# Patient Record
Sex: FEMALE | Employment: UNEMPLOYED | URBAN - METROPOLITAN AREA
[De-identification: names, ages, dates, MRNs, and addresses within clinical notes are randomized per-mention and may not be internally consistent; named-entity substitution may affect disease eponyms.]

---

## 2021-10-23 LAB
ANTIBODY SCREEN, EXTERNAL: NEGATIVE
HBSAG, EXTERNAL: NEGATIVE
HCT, EXTERNAL: 39.8
HEPATITIS C AB,   EXT: NEGATIVE
HGB, EXTERNAL: 13.1
HIV, EXTERNAL: NON REACTIVE
PLATELET CNT,   EXTERNAL: 252
RUBELLA, EXTERNAL: NORMAL
T. PALLIDUM, EXTERNAL: NON REACTIVE
TYPE, ABO & RH, EXTERNAL: NORMAL
URINALYSIS, EXTERNAL: NORMAL

## 2021-10-25 LAB
CHLAMYDIA, EXTERNAL: NEGATIVE
N. GONORRHEA, EXTERNAL: NEGATIVE

## 2021-11-23 LAB
AFP, MATERNAL, EXTERNAL: NEGATIVE
NIPT, EXTERNAL: NEGATIVE
URINALYSIS, EXTERNAL: NORMAL

## 2022-03-18 LAB
ANTIBODY SCREEN, EXTERNAL: NEGATIVE
GTT, 1 HR, GLUCOLA, EXTERNAL: 143
HCT, EXTERNAL: 35.8
HGB, EXTERNAL: 11.7
PLATELET CNT,   EXTERNAL: 215
T. PALLIDUM, EXTERNAL: NON REACTIVE

## 2022-03-25 LAB
GTT 120 MIN, EXTERNAL: 106
GTT 180 MIN, EXTERNAL: 78
GTT 60 MIN, EXTERNAL: 149
GTT, FASTING, EXTERNAL: 76

## 2022-04-07 ENCOUNTER — TELEPHONE (OUTPATIENT)
Dept: OBGYN CLINIC | Age: 22
End: 2022-04-07

## 2022-04-07 NOTE — TELEPHONE ENCOUNTER
Called and left a message for the patient. She needs to have her records SENT to the practice prior to her appointment. She can be scheduled on 04/18 at 9:30 with Dr. Steven Baird in a 30 minutes slot. Please explain to the patient that she needs to arrive 15 minutes early for check in and screening and have everything sent to us next week to review prior to her appointment since she is a late in care transfer.

## 2022-04-18 ENCOUNTER — INITIAL PRENATAL (OUTPATIENT)
Dept: OBGYN CLINIC | Age: 22
End: 2022-04-18

## 2022-04-18 VITALS — SYSTOLIC BLOOD PRESSURE: 114 MMHG | DIASTOLIC BLOOD PRESSURE: 64 MMHG | WEIGHT: 228 LBS

## 2022-04-18 DIAGNOSIS — Z34.03 ENCOUNTER FOR SUPERVISION OF NORMAL FIRST PREGNANCY IN THIRD TRIMESTER: Primary | ICD-10-CM

## 2022-04-18 DIAGNOSIS — Z3A.33 33 WEEKS GESTATION OF PREGNANCY: ICD-10-CM

## 2022-04-18 PROCEDURE — 0502F SUBSEQUENT PRENATAL CARE: CPT | Performed by: OBSTETRICS & GYNECOLOGY

## 2022-04-18 NOTE — PROGRESS NOTES
Current pregnancy history:    Jarek Carter is a  25 y.o. female DECLINED No LMP recorded (lmp unknown). Patient is pregnant. .  She presents for the evaluation of amenorrhea and a positive pregnancy test.    Fetal heart tones= 140's  +FM  Currently works in a factory-on feet all day    LMP history:  The date of her LMP is 7/26/39 certain. Her last menstrual period was normal.  A urine pregnancy test was positive      Based on her LMP, her EDC is 6/1/22 and her EGA is 33 weeks,5 days. Her menstrual cycles are regular and occur approximately every 28 days  and range from 3 to 5 days. Ultrasound data:  She had an  ultrasound done by the ultrasound tech at [facility] with a gestational age of 26 weeks and 5 days giving an [address] of 6/8/22. Pregnancy symptoms:    Since her LMP she has experienced  urinary frequency, breast tenderness, and nausea. She has not been vomiting over the last few weeks. Associated signs and symptoms which she denies: dysuria, discharge, vaginal bleeding. Relevant pregnancy history: This is her first pregnancy with no prior abortions or miscarriages. Patient is having a boy   Pt has had her TDAP and only one vaccine for COVID 19   Genetic screening WNL   Negative for AFP   No swelling or concerns   No relevant medical or surgical history    Relevant past medical history:(relevant to this pregnancy): noncontributory. Pap/Occupational history:  Last pap smear: last year Results: Normal            Substance history: negative for alcohol, tobacco and street drugs. Positive for nothing. Exposure history: There is/are no indoor cat/s in the home. The patient was instructed to not change the cat litter. She admits close contact with children on a regular basis. She has had chicken pox or the vaccine in the past.   Patient denies issues with domestic violence.      Genetic Screening/Teratology Counseling: (Includes patient, baby's father, or anyone in either family with:)  1.  Patient's age >/= 28 at EDC?--no  2. Thalassemia ([facility] [place], [address], or  background): MCV<80?--no.     3.  Neural tube defect (meningomyelocele, spina bifida, anencephaly)?--no.   4.  Congenital heart defect?--no.  5.  Down syndrome?--no.   6.  Lamonte-Sachs (Taoism, [place])?--no.   7.  Canavan's Disease?--no.   8.  Familial Dysautonomia?--no.   9.  Sickle cell disease or trait ()? --no   The patient has not been tested for sickle trait  10. Hemophilia or other blood disorders?--no. 11.  Muscular dystrophy?--no. 12.  Cystic fibrosis?--no. 13.  Issaquena's Chorea?--no. 14.  Mental retardation/autism (if yes was person tested for Fragile X)?--no. 15.  Other inherited genetic or chromosomal disorder?--no. 12.  Maternal metabolic disorder (DM, PKU, etc)?--no. 17.  Patient or FOB with a child with a birth defect not listed above?--no.  17a. Patient or FOB with a birth defect themselves?--no. 18.  Recurrent pregnancy loss, or stillbirth?--no. 19.  Any medications since LMP other than prenatal vitamins (include vitamins, supplements, OTC meds, drugs, alcohol)?--no. 20.  Any other genetic/environmental exposure to discuss?--no. Infection History:  1. Lives with someone with TB or TB exposed?--no.   2.  Patient or partner has history of genital herpes?--no.  3.  Rash or viral illness since LMP?--no.    4.  History of STD (GC, CT, HPV, syphilis, HIV)? --no   5. Other: OTHER? No past medical history on file. No past surgical history on file. Social History     Occupational History    Not on file   Tobacco Use    Smoking status: Never Smoker    Smokeless tobacco: Never Used   Vaping Use    Vaping Use: Never used   Substance and Sexual Activity    Alcohol use: No     Alcohol/week: 0.0 standard drinks    Drug use: No    Sexual activity: Yes     Partners: Male     Birth control/protection: None     No family history on file.   OB History    Para Term  AB Living   1 0           SAB IAB Ectopic Molar Multiple Live Births   0 0              # Outcome Date GA Lbr Demetrio/2nd Weight Sex Delivery Anes PTL Lv   1 Current              No Known Allergies  Prior to Admission medications    Medication Sig Start Date End Date Taking? Authorizing Provider   PNV FL.51/[id number] fum/folic ac (PRENATAL PO) Take  by mouth.    Yes Provider, Historical        Review of Systems: History obtained from the patient  Constitutional: negative for weight loss, fever, night sweats  HEENT: negative for hearing loss, earache, congestion, snoring, sore throat  CV: negative for chest pain, palpitations, edema  Resp: negative for cough, shortness of breath, wheezing  Breast: negative for breast lumps, nipple discharge, galactorrhea  GI: negative for change in bowel habits, abdominal pain, black or bloody stools  : negative for frequency, dysuria, hematuria, vaginal discharge  MSK: negative for back pain, joint pain, muscle pain  Skin: negative for itching, rash, hives  Neuro: negative for dizziness, headache, confusion, weakness  Psych: negative for anxiety, depression, change in mood  Heme/lymph: negative for bleeding, bruising, pallor    Objective:  Visit Vitals  /64 (BP 1 Location: Left arm, BP Patient Position: Sitting)   Wt 228 lb (103.4 kg)   LMP  (LMP Unknown)       Physical Exam:     Constitutional  · Appearance: well-nourished, well developed, alert, in no acute distress    HENT  · Head  · Face: appears normal  · Eyes: appear normal  · Ears: normal  · Mouth: normal  · Lips: no lesions      Chest  · Respiratory Effort: breathing unlabored     Cardiovascular  · Heart:  · Auscultation: regular rate and rhythm without murmur      Gastrointestinal  · Abdominal Examination: abdomen non-tender to palpation, normal bowel sounds, no masses present  · Liver and spleen: no hepatomegaly present, spleen not palpable  · Hernias: no hernias identified    Genitourinary  · deferred    Skin  · General Inspection: no rash, no lesions identified    Neurologic/Psychiatric  · Mental Status:  · Orientation: grossly oriented to person, place and time  · Mood and Affect: mood normal, affect appropriate    Assessment:   Intrauterine pregnancy with issues addressed in problem list  Plan:   Patient declines presence of chaperone during today's visit. Offered CF testing, CVS, Nuchal Translucency, MSAFP, amnio, and discussed NIPT  Course of pregnancy discussed including visit schedule, routine U/S, glucola testing, etc.  Avoid alcoholic beverages and illicit/recreational drugs use  Take prenatal vitamins or folic acid daily. Hospital and practice style discussed with coverage system. Discussed nutrition, toxoplasmosis precautions, sexual activity, exercise, need for influenza vaccine, environmental and work hazards, travel advice, screen for domestic violence, need for seat belts. Discussed seafood, unpasteurized dairy products, deli meat, artificial sweeteners, and caffeine. Discussed current prescription drug use. Given medication list.  Discussed the use of over the counter medications and chemicals. Route of delivery discussed, including risks, benefits     Handouts given to pt.

## 2022-04-18 NOTE — PATIENT INSTRUCTIONS
Weeks 32 to 34 of Your Pregnancy: Care Instructions  Overview     During the last few weeks of your pregnancy, you may have more aches and pains. It's important to rest when you can. Your growing baby is putting more pressure on your bladder. So you may need to urinate more often. Hemorrhoids are also common. These are painful, itchy veins in the rectal area. You may want to talk with your doctor about banking your baby's umbilical cord blood. This is the blood left in the cord after birth. If you want to save this blood, you must arrange it ahead of time. You can't decide at the last minute. If you haven't already had the Tdap shot during this pregnancy, talk to your doctor about getting it. It will help protect your  against pertussis infection. Follow-up care is a key part of your treatment and safety. Be sure to make and go to all appointments, and call your doctor if you are having problems. It's also a good idea to know your test results and keep a list of the medicines you take. How can you care for yourself at home? Ease hemorrhoids  · Get more liquids, fruits, vegetables, and fiber in your diet. This will help keep your stools soft. · Avoid sitting for too long. Lie on your left side several times a day. · Clean yourself with soft, moist toilet paper. Or you can use witch hazel pads or personal hygiene pads. · If you are uncomfortable, try ice packs. Or you can sit in a warm sitz bath. Do these for 20 minutes at a time, as needed. · Use hydrocortisone cream for pain and itching. Two examples are Anusol and Preparation H Hydrocortisone. · Ask your doctor about taking an over-the-counter stool softener. Consider breastfeeding  · Experts recommend breastfeeding for 1 year or longer. · Breast milk may help protect your child from some health problems.  babies are less likely than formula-fed babies to:  ? Get ear infections, colds, diarrhea, and pneumonia. ?  Be obese or get diabetes later in life. · Breastfeeding causes the release of a hormone called oxytocin. This hormone may help your uterus shrink back faster. · Breastfeeding may help you lose weight faster. Making milk burns calories. · Breastfeeding can lower your risk of breast cancer, ovarian cancer, and osteoporosis. Decide about circumcision for your baby  · As you make this decision, it may help to think about your personal, Latter-day, and family traditions. You get to decide if you will keep your baby's penis natural or if your baby will be circumcised. · If you decide that you would like to have your baby circumcised, talk with your doctor. You can share your concerns about pain. And you can discuss your preferences for anesthesia. Where can you learn more? Go to http://www.[employer].com/  Enter X711 in the search box to learn more about \"Weeks 32 to 34 of Your Pregnancy: Care Instructions. \"  Current as of: June 16, 2021               Content Version: 13.2  © 8658-8564 Healthwise, [employer]. Care instructions adapted under license by [employer] (which disclaims liability or warranty for this information). If you have questions about a medical condition or this instruction, always ask your healthcare professional. [address] any warranty or liability for your use of this information.

## 2022-04-19 DIAGNOSIS — Z3A.33 33 WEEKS GESTATION OF PREGNANCY: ICD-10-CM

## 2022-05-04 ENCOUNTER — ROUTINE PRENATAL (OUTPATIENT)
Dept: OBGYN CLINIC | Age: 22
End: 2022-05-04

## 2022-05-04 VITALS — DIASTOLIC BLOOD PRESSURE: 64 MMHG | WEIGHT: 237 LBS | SYSTOLIC BLOOD PRESSURE: 120 MMHG

## 2022-05-04 DIAGNOSIS — Z3A.33 33 WEEKS GESTATION OF PREGNANCY: ICD-10-CM

## 2022-05-04 DIAGNOSIS — Z34.03 ENCOUNTER FOR SUPERVISION OF NORMAL FIRST PREGNANCY IN THIRD TRIMESTER: Primary | ICD-10-CM

## 2022-05-04 PROCEDURE — 0502F SUBSEQUENT PRENATAL CARE: CPT | Performed by: OBSTETRICS & GYNECOLOGY

## 2022-05-04 NOTE — PATIENT INSTRUCTIONS
Weeks 34 to 36 of Your Pregnancy: Care Instructions  Overview     By now, your baby and your belly have grown quite large. It's almost time to give birth! Your baby's lungs are almost ready to breathe air. The skull bones are firm enough to protect your baby's head, but soft enough to move down through the birth canal.  You may be feeling excited and happy at times--but also anxious or scared. You might wonder how you'll know if you're in labor or what to expect during labor. Try to be open and flexible in your expectations of the birth. Because each birth is different, there's no way to know exactly what childbirth will be like for you. Talk to your doctor or midwife about any concerns you have. If you haven't already had the Tdap shot during this pregnancy, talk to your doctor about getting it. It will help protect your  against pertussis infection. In the 36th week, you'll probably have a test for group B streptococcus (GBS). GBS is a common type of bacteria that can live in the vagina and rectum. It can make your baby sick after birth. If you test positive, you will get antibiotics during labor. The medicine will help keep your baby from getting the bacteria. Follow-up care is a key part of your treatment and safety. Be sure to make and go to all appointments, and call your doctor if you are having problems. It's also a good idea to know your test results and keep a list of the medicines you take. How can you care for yourself at home? Learn about pain relief choices  · Pain is different for everyone. Talk with your doctor about your feelings about pain. · You can choose from several types of pain relief. These include medicine, breathing techniques, and comfort measures. You can use more than one option. · If you choose to have pain medicine during labor, talk to your doctor about your options. Some medicines lower anxiety and help with some of the pain.  Others make your lower body numb so that you won't feel pain. · Be sure to tell your doctor about your pain medicine choice before you start labor or very early in your labor. You may be able to change your mind as labor progresses. Labor and delivery  · The first stage of labor has three parts: early, active, and transition. ? It's common to have early labor at home. You can stay busy or rest, eat light snacks, drink clear fluids, and start counting contractions. ? When talking during a contraction gets hard, you may be moving to active labor. During active labor, you should head for the hospital if you aren't there already. ? You are in active labor when contractions come every 3 to 4 minutes and last about 60 seconds. Your cervix is opening more rapidly. ? If your water breaks, contractions will come faster and stronger. ? During transition, your cervix is stretching, and contractions are coming more rapidly. ? You may want to push, but your cervix might not be ready. Your doctor will tell you when to push. · The second stage starts when your cervix is completely opened and you are ready to push. ? Contractions are very strong to push the baby down the birth canal.  ? You will probably feel the urge to push. You may feel like you need to have a bowel movement. ? You may be coached to push with contractions. These contractions will be very strong, but you won't have them as often. You can get a little rest between contractions. ? One last push, and your baby is born. · The third stage is when a few more contractions push out the placenta. This may take 30 minutes or less. Where can you learn more? Go to http://www.gray.com/  Enter B912 in the search box to learn more about \"Weeks 34 to 36 of Your Pregnancy: Care Instructions. \"  Current as of: June 16, 2021               Content Version: 13.2  © 5564-8854 [employer]    Care instructions adapted under license by [employer] (which disclaims liability or warranty for this information). If you have questions about a medical condition or this instruction, always ask your healthcare professional. [address] any warranty or liability for your use of this information.

## 2022-05-11 ENCOUNTER — ROUTINE PRENATAL (OUTPATIENT)
Dept: OBGYN CLINIC | Age: 22
End: 2022-05-11

## 2022-05-11 VITALS
BODY MASS INDEX: 40.97 KG/M2 | DIASTOLIC BLOOD PRESSURE: 70 MMHG | SYSTOLIC BLOOD PRESSURE: 110 MMHG | WEIGHT: 240 LBS | HEIGHT: 64 IN

## 2022-05-11 DIAGNOSIS — Z34.90 ENCOUNTER FOR SUPERVISION OF NORMAL PREGNANCY, ANTEPARTUM, UNSPECIFIED GRAVIDITY: Primary | ICD-10-CM

## 2022-05-11 DIAGNOSIS — Z3A.33 33 WEEKS GESTATION OF PREGNANCY: ICD-10-CM

## 2022-05-11 LAB — GRBS, EXTERNAL: NEGATIVE

## 2022-05-11 PROCEDURE — 0502F SUBSEQUENT PRENATAL CARE: CPT | Performed by: OBSTETRICS & GYNECOLOGY

## 2022-05-11 NOTE — PATIENT INSTRUCTIONS
Weeks 34 to 36 of Your Pregnancy: Care Instructions  Overview     By now, your baby and your belly have grown quite large. It's almost time to give birth! Your baby's lungs are almost ready to breathe air. The skull bones are firm enough to protect your baby's head, but soft enough to move down through the birth canal.  You may be feeling excited and happy at times--but also anxious or scared. You might wonder how you'll know if you're in labor or what to expect during labor. Try to be open and flexible in your expectations of the birth. Because each birth is different, there's no way to know exactly what childbirth will be like for you. Talk to your doctor or midwife about any concerns you have. If you haven't already had the Tdap shot during this pregnancy, talk to your doctor about getting it. It will help protect your  against pertussis infection. In the 36th week, you'll probably have a test for group B streptococcus (GBS). GBS is a common type of bacteria that can live in the vagina and rectum. It can make your baby sick after birth. If you test positive, you will get antibiotics during labor. The medicine will help keep your baby from getting the bacteria. Follow-up care is a key part of your treatment and safety. Be sure to make and go to all appointments, and call your doctor if you are having problems. It's also a good idea to know your test results and keep a list of the medicines you take. How can you care for yourself at home? Learn about pain relief choices  · Pain is different for everyone. Talk with your doctor about your feelings about pain. · You can choose from several types of pain relief. These include medicine, breathing techniques, and comfort measures. You can use more than one option. · If you choose to have pain medicine during labor, talk to your doctor about your options. Some medicines lower anxiety and help with some of the pain.  Others make your lower body numb so that you won't feel pain. · Be sure to tell your doctor about your pain medicine choice before you start labor or very early in your labor. You may be able to change your mind as labor progresses. Labor and delivery  · The first stage of labor has three parts: early, active, and transition. ? It's common to have early labor at home. You can stay busy or rest, eat light snacks, drink clear fluids, and start counting contractions. ? When talking during a contraction gets hard, you may be moving to active labor. During active labor, you should head for the hospital if you aren't there already. ? You are in active labor when contractions come every 3 to 4 minutes and last about 60 seconds. Your cervix is opening more rapidly. ? If your water breaks, contractions will come faster and stronger. ? During transition, your cervix is stretching, and contractions are coming more rapidly. ? You may want to push, but your cervix might not be ready. Your doctor will tell you when to push. · The second stage starts when your cervix is completely opened and you are ready to push. ? Contractions are very strong to push the baby down the birth canal.  ? You will probably feel the urge to push. You may feel like you need to have a bowel movement. ? You may be coached to push with contractions. These contractions will be very strong, but you won't have them as often. You can get a little rest between contractions. ? One last push, and your baby is born. · The third stage is when a few more contractions push out the placenta. This may take 30 minutes or less. Where can you learn more? Go to http://www.gray.com/  Enter B912 in the search box to learn more about \"Weeks 34 to 36 of Your Pregnancy: Care Instructions. \"  Current as of: June 16, 2021               Content Version: 13.2  © 7159-6924 [employer].    Care instructions adapted under license by "[employer]," (which disclaims liability or warranty for this information). If you have questions about a medical condition or this instruction, always ask your healthcare professional. [address] any warranty or liability for your use of this information.

## 2022-05-14 LAB
BACTERIA SPEC CULT: NORMAL
SERVICE CMNT-IMP: NORMAL

## 2022-05-17 NOTE — PATIENT INSTRUCTIONS
Week 40 of Your Pregnancy: Care Instructions  Overview     You are near the end of your pregnancy--and you're probably pretty uncomfortable. It may be harder to walk around. Lying down probably isn't comfortable either. You may have trouble getting to sleep or staying asleep. Most babies are born between 40 and 41 weeks. This is a good time to think about packing a bag for the hospital with items you'll need. Then you'll be ready when labor starts. Follow-up care is a key part of your treatment and safety. Be sure to make and go to all appointments, and call your doctor if you are having problems. It's also a good idea to know your test results and keep a list of the medicines you take. How can you care for yourself at home? Learn about breastfeeding  · Breastfeeding is best for your baby and good for you. · Breast milk has antibodies to help your baby fight infections. · If you breastfeed, you may lose weight faster. That's because making milk burns calories. · Learning the best ways to hold your baby will make breastfeeding easier. · Sometimes breastfeeding can make partners feel left out. If you have a partner, plan how you can care for your baby together. For example, your partner can bathe and diaper the baby. You can snuggle together when you breastfeed. · You may want to learn how to use a breast pump and store your milk. · If you choose to bottle feed, make the feeding feel like breastfeeding so you can bond with your baby. Always hold your baby and the bottle. Don't prop bottles or let your baby fall asleep with a bottle. Learn about crying  · It's common for babies to cry for 1 to 3 hours a day. Some cry more, and some cry less. · Babies don't cry to make you upset or because you're a bad parent. · Crying is how your baby communicates. Your baby may be hungry; have gas; need a diaper change; or feel cold, warm, tired, lonely, or tense. Sometimes babies cry for unknown reasons.   · If you respond to your baby's needs, your baby will learn to trust you. · Try to stay calm when your baby cries. Your baby may get more upset if they sense that you are upset. Know how to care for your   · Your baby's umbilical cord stump will drop off on its own, usually between 1 and 2 weeks. To care for your baby's umbilical cord area:  ? Clean the area at the bottom of the cord 2 or 3 times a day. ? Pay special attention to the area where the cord attaches to the skin. ? Keep the diaper folded below the cord. ? Use a damp washcloth or cotton ball to sponge bathe your baby until the stump has come off. · Your baby's first dark stool is called meconium. After the meconium is passed, your baby will develop their own bowel pattern. ? Some babies, especially  babies, have several bowel movements a day. Others have one or two a day, or one every 2 to 3 days. ?  babies often have loose, yellow stools. Formula-fed babies have more formed stools. ? If your baby's stools look like little pellets, your baby is constipated. After 2 days of constipation, call your baby's doctor. · If your baby will be circumcised, you can care for your baby at home. ? Gently rinse your baby's penis with warm water after every diaper change. Don't try to remove the film that forms on the penis. This film will go away on its own. Pat dry. ? Put petroleum ointment, such as Vaseline, on the area of the diaper that will touch your baby's penis. This will keep the diaper from sticking to your baby. ? Ask the doctor about giving your baby acetaminophen (Tylenol) for pain. Where can you learn more? Go to http://www.gray.com/  Enter N257 in the search box to learn more about \"Week 37 of Your Pregnancy: Care Instructions. \"  Current as of: 2021               Content Version: 13.2  © 5506-7315 [employer].    Care instructions adapted under license by [employer] (which disclaims liability or warranty for this information). If you have questions about a medical condition or this instruction, always ask your healthcare professional. [address] any warranty or liability for your use of this information.

## 2022-05-18 ENCOUNTER — ROUTINE PRENATAL (OUTPATIENT)
Dept: OBGYN CLINIC | Age: 22
End: 2022-05-18

## 2022-05-18 VITALS — BODY MASS INDEX: 41.2 KG/M2 | SYSTOLIC BLOOD PRESSURE: 110 MMHG | DIASTOLIC BLOOD PRESSURE: 64 MMHG | WEIGHT: 240 LBS

## 2022-05-18 DIAGNOSIS — Z34.03 ENCOUNTER FOR SUPERVISION OF NORMAL FIRST PREGNANCY IN THIRD TRIMESTER: Primary | ICD-10-CM

## 2022-05-18 PROCEDURE — 0502F SUBSEQUENT PRENATAL CARE: CPT | Performed by: OBSTETRICS & GYNECOLOGY

## 2022-05-24 ENCOUNTER — ROUTINE PRENATAL (OUTPATIENT)
Dept: OBGYN CLINIC | Age: 22
End: 2022-05-24

## 2022-05-24 VITALS — BODY MASS INDEX: 41.2 KG/M2 | SYSTOLIC BLOOD PRESSURE: 116 MMHG | WEIGHT: 240 LBS | DIASTOLIC BLOOD PRESSURE: 70 MMHG

## 2022-05-24 DIAGNOSIS — Z34.03 ENCOUNTER FOR SUPERVISION OF NORMAL FIRST PREGNANCY IN THIRD TRIMESTER: Primary | ICD-10-CM

## 2022-05-24 PROCEDURE — 0502F SUBSEQUENT PRENATAL CARE: CPT | Performed by: OBSTETRICS & GYNECOLOGY

## 2022-05-24 NOTE — PATIENT INSTRUCTIONS
Week 38 of Your Pregnancy: Care Instructions  Overview     Believe it or not, your baby is almost here. You may have ideas about your baby's personality because of how much your baby moves. Or you may have noticed how your baby responds to sounds, warmth, cold, and light. You may even know what kind of music your baby likes. By now, you have a better idea of what to expect during delivery. You may have talked about your birth preferences with your doctor. But even if you want a vaginal birth, it's a good idea to learn about  births.  birth means that your baby is born through a cut (incision) in your lower belly. In some cases it may be the best choice for the health of you and your baby. Follow-up care is a key part of your treatment and safety. Be sure to make and go to all appointments, and call your doctor if you are having problems. It's also a good idea to know your test results and keep a list of the medicines you take. How can you care for yourself at home? Learn about  birth  · Most C-sections are unplanned. They are done because of problems that occur during labor. These problems might include:  ? Labor that slows or stops. ? High blood pressure or other problems for you.  ? Signs of distress in your baby. These signs may include a very fast or slow heart rate. · Although you and your baby are likely do well after a , it is major surgery. It has more risks than a vaginal delivery. · In some cases, a planned  may be safer than a vaginal delivery. This may be the case if:  ? You have a health problem, such as a heart condition. ? Your baby isn't in a head-down position for delivery. This is called a breech position. ? The uterus has scars from past surgeries. This could increase the chance of a tear in the uterus. ? There is a problem with the placenta.  ? You have an infection, such as genital herpes, that could be spread to your baby.   ? You are having twins or more. ? Your baby weighs 9 to 10 pounds or more. · Because of the risks of a , planned C-sections generally should be done only for medical reasons. And a planned  should be done at 39 weeks or later unless there is a medical reason to do it sooner. Know what to expect after delivery, and plan for the first few weeks at home  · You, your baby, and your partner or  will get identification bands. Only people with matching bands can  the baby from the nursery. · You will learn how to feed, diaper, and bathe your baby. And you will learn how to care for the umbilical cord stump. If your baby will be circumcised, you will also learn how to care for that. · Ask people to wait to visit you until you are at home. And ask them to wash their hands before they touch your baby. · Make sure you have another adult in your home for at least 2 or 3 days after the birth. · During the first 2 weeks, limit when friends and family can visit. · Do not allow visitors who have colds or infections. Make sure all visitors are up to date with their vaccinations. Never let anyone smoke around your baby. · Try to nap when the baby naps. Be aware of postpartum depression  · \"Baby blues\" are common for the first 1 to 2 weeks after birth. You may cry or feel sad or irritable for no reason. · Sometimes these feelings last longer and are more intense. This is called postpartum depression. · If your symptoms last for more than a few weeks or you feel very depressed, ask your doctor for help. · Postpartum depression can be treated. Support groups and counseling can help. Sometimes medicine can also help. Where can you learn more? Go to http://www.gray.com/  Enter B044 in the search box to learn more about \"Week 38 of Your Pregnancy: Care Instructions. \"  Current as of: 2021               Content Version: 13.2  © 9361-7510 Healthwise, [facility].    Care instructions adapted under license by [employer] (which disclaims liability or warranty for this information). If you have questions about a medical condition or this instruction, always ask your healthcare professional. [address] any warranty or liability for your use of this information.

## 2022-05-31 ENCOUNTER — TELEPHONE (OUTPATIENT)
Dept: OBGYN CLINIC | Age: 22
End: 2022-05-31

## 2022-05-31 NOTE — TELEPHONE ENCOUNTER
Call received at 10:31am      25year old patient last seen in the office on 2022    Patient is  38w7d pregnant    Patient states every thing is ok with the baby     Patient is calling and sent a message to check on the status of her paper work left with the office last week    Annette Ledbetter  to Estee Bunn MD    [address] [facility]      22 11:11 AM  Hi this is mandie   I give a paper to one of your nurses the paper needs to be filled out and I want to see if the paper its ready to be    Please let me know          Patient also wondering about ob fob appointments for this week since her next appointment is 2022      Please advised of available appointment time    Thank you

## 2022-05-31 NOTE — TELEPHONE ENCOUNTER
Remi Chappell, Carlos A Yarbrough RN  Cc: Linda Campos MD  Caller: Unspecified (Today, 10:29 AM)  Form is completed, available for her at . Dayanara Acosta made her an appointment on 6/1 at 10:10. Patient advised of form completed and appointment for tomorrow    Patient verbalized understanding.

## 2022-06-01 ENCOUNTER — ROUTINE PRENATAL (OUTPATIENT)
Dept: OBGYN CLINIC | Age: 22
End: 2022-06-01

## 2022-06-01 VITALS — WEIGHT: 247 LBS | SYSTOLIC BLOOD PRESSURE: 110 MMHG | BODY MASS INDEX: 42.4 KG/M2 | DIASTOLIC BLOOD PRESSURE: 76 MMHG

## 2022-06-01 DIAGNOSIS — Z34.03 ENCOUNTER FOR SUPERVISION OF NORMAL FIRST PREGNANCY IN THIRD TRIMESTER: Primary | ICD-10-CM

## 2022-06-01 DIAGNOSIS — Z3A.33 33 WEEKS GESTATION OF PREGNANCY: ICD-10-CM

## 2022-06-01 PROCEDURE — 0502F SUBSEQUENT PRENATAL CARE: CPT | Performed by: OBSTETRICS & GYNECOLOGY

## 2022-06-01 NOTE — PROGRESS NOTES
Doing well, reports active FM, discussed kick counts  Cautioned regarding weight gain; labor precautions; US next week

## 2022-06-01 NOTE — PATIENT INSTRUCTIONS
Week 39 of Your Pregnancy: Care Instructions  Your Care Instructions     During these final weeks, you may feel anxious to see your new baby. [place] babies often look different from what you see in pictures or movies. Right after birth, their heads may have a strange shape. Their eyes may be puffy. And their genitals may be swollen. They may also have very dry skin, or red marks on the eyelids, nose, or neck. Still, most parents think their babies are beautiful. Follow-up care is a key part of your treatment and safety. Be sure to make and go to all appointments, and call your doctor if you are having problems. It's also a good idea to know your test results and keep a list of the medicines you take. How can you care for yourself at home? Prepare to breastfeed  · If you are breastfeeding, continue to eat healthy foods. · If your health care provider recommends it, keep taking your prenatal vitamins. · Talk to your doctor before you take any medicine or supplement. Some medicines can affect your breast milk. · You can help prevent sore nipples if you feed your baby in the correct position. Nurses will help you learn to do this. Choose the right birth control after your baby is born  · If you are breastfeeding, you can still get pregnant. Use birth control if you don't want to get pregnant. · Intrauterine devices (IUDs) are very effective at preventing pregnancy and can provide birth control for 3 to 10 years, depending on the type. If you talk with your doctor before you have your baby, the IUD can be placed right after you give birth. If you decide you want to get pregnant later, you can have it removed. IUDs are safe to use while you are breastfeeding. · A hormonal implant is also very effective at preventing pregnancy. It is placed under the skin of the arm. This can be done right after you give birth. It releases the hormone progestin and prevents pregnancy for about 3 years.  This can also be removed by a doctor at any time. It is safe to use while you are breastfeeding. · Depo-Provera can be used while you are breastfeeding. It's a shot you get every 3 months. · Birth control pills work well. But you need a different kind of pill for the first few weeks after you give birth. And when you start taking these pills, you need to make sure to use another type of birth control for 7 days after you start your pack. · Diaphragms, cervical caps, and condoms with spermicide work less well after birth. If you have a diaphragm or cervical cap, talk to your doctor to see if you need a different size. · Tubal ligation (tying your tubes) and vasectomy are both permanent. These are good options if you are sure you are done having children. Where can you learn more? Go to http://www.gray.com/  Enter A811 in the search box to learn more about \"Week 39 of Your Pregnancy: Care Instructions. \"  Current as of: June 16, 2021               Content Version: 13.2  © 2006-2022 [email or web address]. Care instructions adapted under license by [employer] (which disclaims liability or warranty for this information). If you have questions about a medical condition or this instruction, always ask your healthcare professional. [address] any warranty or liability for your use of this information.

## 2022-06-02 ENCOUNTER — HOSPITAL ENCOUNTER (INPATIENT)
Age: 22
LOS: 3 days | Discharge: HOME | End: 2022-06-05
Attending: OBSTETRICS & GYNECOLOGY | Admitting: OBSTETRICS & GYNECOLOGY

## 2022-06-02 ENCOUNTER — ANESTHESIA (OUTPATIENT)
Dept: LABOR AND DELIVERY | Age: 22
End: 2022-06-02

## 2022-06-02 ENCOUNTER — ANESTHESIA EVENT (OUTPATIENT)
Dept: LABOR AND DELIVERY | Age: 22
End: 2022-06-02

## 2022-06-02 PROBLEM — O42.90 PROM (PREMATURE RUPTURE OF MEMBRANES): Status: ACTIVE | Noted: 2022-06-02

## 2022-06-02 LAB
A1 MICROGLOB PLACENTAL VAG QL: POSITIVE
BASOPHILS # BLD: 0 K/UL (ref 0–0.1)
BASOPHILS NFR BLD: 0 % (ref 0–1)
CONTROL LINE PRESENT?: NORMAL
DIFFERENTIAL METHOD BLD: NORMAL
EOSINOPHIL # BLD: 0.1 K/UL (ref 0–0.4)
EOSINOPHIL NFR BLD: 1 % (ref 0–7)
ERYTHROCYTE [DISTWIDTH] IN BLOOD BY AUTOMATED COUNT: 14.3 % (ref 11.5–14.5)
EXPIRATION DATE: NORMAL
HCT VFR BLD AUTO: 36.1 % (ref 35–47)
HGB BLD-MCNC: 11.5 G/DL (ref 11.5–16)
IMM GRANULOCYTES # BLD AUTO: 0 K/UL (ref 0–0.04)
IMM GRANULOCYTES NFR BLD AUTO: 0 % (ref 0–0.5)
INTERNAL NEGATIVE CONTROL: NORMAL
KIT LOT NO.: NORMAL
LYMPHOCYTES # BLD: 1.4 K/UL (ref 0.8–3.5)
LYMPHOCYTES NFR BLD: 20 % (ref 12–49)
MCH RBC QN AUTO: 27.3 PG (ref 26–34)
MCHC RBC AUTO-ENTMCNC: 31.9 G/DL (ref 30–36.5)
MCV RBC AUTO: 85.5 FL (ref 80–99)
MONOCYTES # BLD: 0.6 K/UL (ref 0–1)
MONOCYTES NFR BLD: 9 % (ref 5–13)
NEUTS SEG # BLD: 4.9 K/UL (ref 1.8–8)
NEUTS SEG NFR BLD: 70 % (ref 32–75)
NRBC # BLD: 0 K/UL (ref 0–0.01)
NRBC BLD-RTO: 0 PER 100 WBC
PLATELET # BLD AUTO: 206 K/UL (ref 150–400)
PMV BLD AUTO: 10.4 FL (ref 8.9–12.9)
RBC # BLD AUTO: 4.22 M/UL (ref 3.8–5.2)
WBC # BLD AUTO: 7 K/UL (ref 3.6–11)

## 2022-06-02 PROCEDURE — 74011250636 HC RX REV CODE- 250/636: Performed by: OBSTETRICS & GYNECOLOGY

## 2022-06-02 PROCEDURE — 65270000029 HC RM PRIVATE

## 2022-06-02 PROCEDURE — 74011250636 HC RX REV CODE- 250/636: Performed by: ANESTHESIOLOGY

## 2022-06-02 PROCEDURE — 99285 EMERGENCY DEPT VISIT HI MDM: CPT

## 2022-06-02 PROCEDURE — 74011000250 HC RX REV CODE- 250: Performed by: ANESTHESIOLOGY

## 2022-06-02 PROCEDURE — 75410000002 HC LABOR FEE PER 1 HR

## 2022-06-02 PROCEDURE — 00HU33Z INSERTION OF INFUSION DEVICE INTO SPINAL CANAL, PERCUTANEOUS APPROACH: ICD-10-PCS | Performed by: ANESTHESIOLOGY

## 2022-06-02 PROCEDURE — 84112 EVAL AMNIOTIC FLUID PROTEIN: CPT | Performed by: OBSTETRICS & GYNECOLOGY

## 2022-06-02 PROCEDURE — 77030014125 HC TY EPDRL BBMI -B: Performed by: ANESTHESIOLOGY

## 2022-06-02 PROCEDURE — 85025 COMPLETE CBC W/AUTO DIFF WBC: CPT

## 2022-06-02 PROCEDURE — 76060000078 HC EPIDURAL ANESTHESIA

## 2022-06-02 PROCEDURE — 36415 COLL VENOUS BLD VENIPUNCTURE: CPT

## 2022-06-02 RX ORDER — SODIUM CHLORIDE, SODIUM LACTATE, POTASSIUM CHLORIDE, CALCIUM CHLORIDE 600; 310; 30; 20 MG/100ML; MG/100ML; MG/100ML; MG/100ML
125 INJECTION, SOLUTION INTRAVENOUS CONTINUOUS
Status: DISCONTINUED | OUTPATIENT
Start: 2022-06-03 | End: 2022-06-03

## 2022-06-02 RX ORDER — OXYTOCIN/RINGER'S LACTATE 30/500 ML
87.3 PLASTIC BAG, INJECTION (ML) INTRAVENOUS AS NEEDED
Status: DISCONTINUED | OUTPATIENT
Start: 2022-06-02 | End: 2022-06-03

## 2022-06-02 RX ORDER — TERBUTALINE SULFATE 1 MG/ML
0.25 INJECTION SUBCUTANEOUS AS NEEDED
Status: DISCONTINUED | OUTPATIENT
Start: 2022-06-02 | End: 2022-06-03

## 2022-06-02 RX ORDER — NALBUPHINE HYDROCHLORIDE 20 MG/ML
10 INJECTION, SOLUTION INTRAMUSCULAR; INTRAVENOUS; SUBCUTANEOUS
Status: DISCONTINUED | OUTPATIENT
Start: 2022-06-02 | End: 2022-06-03

## 2022-06-02 RX ORDER — FENTANYL CITRATE 50 UG/ML
100 INJECTION, SOLUTION INTRAMUSCULAR; INTRAVENOUS ONCE
Status: DISCONTINUED | OUTPATIENT
Start: 2022-06-03 | End: 2022-06-03

## 2022-06-02 RX ORDER — NALOXONE HYDROCHLORIDE 0.4 MG/ML
0.4 INJECTION, SOLUTION INTRAMUSCULAR; INTRAVENOUS; SUBCUTANEOUS AS NEEDED
Status: DISCONTINUED | OUTPATIENT
Start: 2022-06-02 | End: 2022-06-03

## 2022-06-02 RX ORDER — OXYTOCIN/RINGER'S LACTATE 30/500 ML
1-25 PLASTIC BAG, INJECTION (ML) INTRAVENOUS
Status: DISCONTINUED | OUTPATIENT
Start: 2022-06-02 | End: 2022-06-03

## 2022-06-02 RX ORDER — NORETHINDRONE AND ETHINYL ESTRADIOL 0.5-0.035
10 KIT ORAL ONCE
Status: DISCONTINUED | OUTPATIENT
Start: 2022-06-03 | End: 2022-06-03

## 2022-06-02 RX ORDER — FENTANYL CITRATE 50 UG/ML
INJECTION, SOLUTION INTRAMUSCULAR; INTRAVENOUS AS NEEDED
Status: DISCONTINUED | OUTPATIENT
Start: 2022-06-02 | End: 2022-06-03 | Stop reason: HOSPADM

## 2022-06-02 RX ORDER — BUPIVACAINE HYDROCHLORIDE 5 MG/ML
INJECTION, SOLUTION EPIDURAL; INTRACAUDAL AS NEEDED
Status: DISCONTINUED | OUTPATIENT
Start: 2022-06-02 | End: 2022-06-03 | Stop reason: HOSPADM

## 2022-06-02 RX ORDER — OXYTOCIN/RINGER'S LACTATE 30/500 ML
10 PLASTIC BAG, INJECTION (ML) INTRAVENOUS AS NEEDED
Status: DISCONTINUED | OUTPATIENT
Start: 2022-06-02 | End: 2022-06-03

## 2022-06-02 RX ORDER — FENTANYL/BUPIVACAINE/NS/PF 2-1250MCG
1-16 PREFILLED PUMP RESERVOIR EPIDURAL CONTINUOUS
Status: DISCONTINUED | OUTPATIENT
Start: 2022-06-03 | End: 2022-06-03

## 2022-06-02 RX ORDER — BUPIVACAINE HYDROCHLORIDE 5 MG/ML
30 INJECTION, SOLUTION EPIDURAL; INTRACAUDAL AS NEEDED
Status: DISCONTINUED | OUTPATIENT
Start: 2022-06-02 | End: 2022-06-03

## 2022-06-02 RX ORDER — LIDOCAINE HYDROCHLORIDE AND EPINEPHRINE 15; 5 MG/ML; UG/ML
INJECTION, SOLUTION EPIDURAL AS NEEDED
Status: DISCONTINUED | OUTPATIENT
Start: 2022-06-02 | End: 2022-06-03 | Stop reason: HOSPADM

## 2022-06-02 RX ADMIN — Medication 2 ML: at 23:33

## 2022-06-02 RX ADMIN — Medication 10 ML/HR: at 23:29

## 2022-06-02 RX ADMIN — SODIUM CHLORIDE, POTASSIUM CHLORIDE, SODIUM LACTATE AND CALCIUM CHLORIDE 125 ML/HR: 600; 310; 30; 20 INJECTION, SOLUTION INTRAVENOUS at 23:39

## 2022-06-02 RX ADMIN — FENTANYL CITRATE 50 MCG: 50 INJECTION, SOLUTION INTRAMUSCULAR; INTRAVENOUS at 23:37

## 2022-06-02 RX ADMIN — SODIUM CHLORIDE, POTASSIUM CHLORIDE, SODIUM LACTATE AND CALCIUM CHLORIDE 1000 ML: 600; 310; 30; 20 INJECTION, SOLUTION INTRAVENOUS at 22:10

## 2022-06-02 RX ADMIN — FENTANYL CITRATE 50 MCG: 50 INJECTION, SOLUTION INTRAMUSCULAR; INTRAVENOUS at 23:38

## 2022-06-02 RX ADMIN — BUPIVACAINE HYDROCHLORIDE 1 ML: 5 INJECTION, SOLUTION EPIDURAL; INTRACAUDAL; PERINEURAL at 23:35

## 2022-06-02 RX ADMIN — OXYTOCIN 2 MILLI-UNITS/MIN: 10 INJECTION INTRAVENOUS at 23:53

## 2022-06-02 RX ADMIN — Medication 2 ML: at 23:34

## 2022-06-02 RX ADMIN — BUPIVACAINE HYDROCHLORIDE 2 ML: 5 INJECTION, SOLUTION EPIDURAL; INTRACAUDAL; PERINEURAL at 23:36

## 2022-06-02 NOTE — PROGRESS NOTES
Assumed care of pt from Dr. Everett Quesada. She is a G1 @ 39 1/7 wks with SROM at 0330 desiring low intervention. She is feeling contractions and coping well. She is accompanied by her mom and friend. FHT baseline 145, mod get, accels present, decels absent  TOCO q 2-4 minutes  Cx 4/80/-1    Cont expectant management for now.

## 2022-06-02 NOTE — H&P
Labor and Delivery JUAN JOSE Note  6/2/2022    25 y.o., , female, G1 P 0 Estimated Date of Delivery: 6/8/22 by dates and US presents with leakage of fluid at 0444  Reports good fetal movement, no bleeding, and has mild contractions. States the gush of fluid happened around 3.30 am today, clear fluid and the contractions started soon thereafter every 15 mins. Active fetal movement    PNC with Dr. Krunal Snell    Primary Provider:  Krunal Snell  No  needed  Late transfer [facility]; 33wk  covid vaccinated x1shot  G1  EDC by LMP/ US  Social:  IOB labs:  Normal    Genetic Screening:  NIPTS/ AFP nl  Anatomy: normal   GTT: 1 hr 143, 3 hr GTT  normal  Flu:__ TDAP: done  Rhogam:  Bpositive  GBS: negative  Circ:         PNC: Blood type: B            RH: pos            Rubella:             SVII serology: neg             GBS status: neg  History reviewed. No pertinent past medical history. History reviewed. No pertinent surgical history. OB/GYN: Dr. Crump Night:   No current facility-administered medications for this encounter.      Allergies: No Known Allergies  Pertinent ROS: per HPI   Family History   Problem Relation Age of Onset    No Known Problems Mother     No Known Problems Father      Social History     Socioeconomic History    Marital status: SINGLE     Spouse name: Not on file    Number of children: Not on file    Years of education: Not on file    Highest education level: Not on file   Occupational History    Not on file   Tobacco Use    Smoking status: Never Smoker    Smokeless tobacco: Never Used   Vaping Use    Vaping Use: Never used   Substance and Sexual Activity    Alcohol use: No     Alcohol/week: 0.0 standard drinks    Drug use: No    Sexual activity: Yes     Partners: Male     Birth control/protection: None   Other Topics Concern     Service No    Blood Transfusions No    Caffeine Concern No    Occupational Exposure No    Hobby Hazards No    Sleep Concern No    Stress Concern No    Weight Concern No    Special Diet No    Back Care No    Exercise No    Bike Helmet No    Seat Belt No    Self-Exams No   Social History Narrative    Not on file     Social Determinants of Health     Financial Resource Strain:     Difficulty of Paying Living Expenses: Not on file   Food Insecurity:     Worried About Running Out of Food in the Last Year: Not on file    Dennis of Food in the Last Year: Not on file   Transportation Needs:     Lack of Transportation (Medical): Not on file    Lack of Transportation (Non-Medical):  Not on file   Physical Activity:     Days of Exercise per Week: Not on file    Minutes of Exercise per Session: Not on file   Stress:     Feeling of Stress : Not on file   Social Connections:     Frequency of Communication with Friends and Family: Not on file    Frequency of Social Gatherings with Friends and Family: Not on file    Attends Gnosticism Services: Not on file    Active Member of [address] [place] [address] or Organizations: Not on file    Attends Club or Organization Meetings: Not on file    Marital Status: Not on file   Intimate Partner Violence:     Fear of Current or Ex-Partner: Not on file    Emotionally Abused: Not on file    Physically Abused: Not on file    Sexually Abused: Not on file   Housing Stability:     Unable to Pay for Housing in the Last Year: Not on file    Number of [place] in the Last Year: Not on file    Unstable Housing in the Last Year: Not on file       OBJECTIVE:  Gravid , female NAD  Temp (24hrs), Av.8 °F (37.1 °C), Min:98.8 °F (37.1 °C), Max:98.8 °F (37.1 °C)    Visit Vitals  /60   Pulse 77   Temp 98.8 °F (37.1 °C)   Resp 16   Ht 5' 4\" (1.626 m)   Wt 112 kg (247 lb)   LMP  (LMP Unknown)   SpO2 99%   Breastfeeding No   BMI 42.40 kg/m²       Labs:  No results found for: WBC    Exam:  HEENT:  normal   Lungs:  clear  Cor:  RRR  Abdomen:  Fundal height 40 cm                    Soft between                     Clinical EFW 7 and 1/2 lbs  Fetal heart rate tracin, +accels, moderate variability,  No decels  Contraction pattern: every 10-12 mins  Cervix: 2/80/-2: Nitrazine positive  Fluid: clear      Impression:  IUP at 39 weeks 1 day with SROM in early labor  GBS negative  Discussed Pitocin augmentation after a reasonable 6-8 hrs of expectant management  Pt agreeable with plan of care    Susi Jeffries MD

## 2022-06-02 NOTE — ROUTINE PROCESS
0730: OB SBAR received from [facility]: patient taken off monitors to ambulate. 1648: Dr Alejandro Lopez at bedside, SVE 4/80/-2. Continue expectant management, encouraged patient to walk.

## 2022-06-02 NOTE — PROGRESS NOTES
9136: Bedside and Verbal shift change report given to NORMA Moraes (oncoming nurse) by DECLAN Arevalo RN (offgoing nurse). Report included the following information SBAR, Kardex, Procedure Summary, Intake/Output, MAR, Accordion, Recent Results and Med Rec Status.

## 2022-06-02 NOTE — H&P
25 y.o., , female, G1 P 0 Estimated Date of Delivery: 6/8/22 by dates and US presents with leakage of fluid at 0444  Reports good fetal movement, no bleeding, and has mild contractions. States the gush of fluid happened around 3.30 am today, clear fluid and the contractions started soon thereafter every 15 mins.     Active fetal movement     PNC with Dr. Douglas Brown     Primary [address]  No  needed  Late transfer [employer] [facility]; 33wk  covid vaccinated x1shot  G1  EDC by LMP/ US  Social:  IOB labs:  Normal    Genetic Screening:  NIPTS/ AFP nl  Anatomy: normal   GTT: 1 hr 143, 3 hr GTT  normal  Flu:__ TDAP: done  Rhogam:  Bpositive  GBS: negative  Circ:           PNC: Blood type: B            RH: pos            Rubella:             SVII serology: neg             GBS status: neg  History reviewed. No pertinent past medical history. History reviewed. No pertinent surgical history. OB/GYN: Dr. Douglas Brown  Meds:   No current facility-administered medications for this encounter.      Allergies: No Known Allergies  Pertinent ROS: per HPI         Family History   Problem Relation Age of Onset    No Known Problems Mother      No Known Problems Father        Social History            Socioeconomic History    Marital status: SINGLE       Spouse name: Not on file    Number of children: Not on file    Years of education: Not on file    Highest education level: Not on file   Occupational History    Not on file   Tobacco Use    Smoking status: Never Smoker    Smokeless tobacco: Never Used   Vaping Use    Vaping Use: Never used   Substance and Sexual Activity    Alcohol use:  No       Alcohol/week: 0.0 standard drinks    Drug use: No    Sexual activity: Yes       Partners: Male       Birth control/protection: None   Other Topics Concern     Service No    Blood Transfusions No    Caffeine Concern No    Occupational Exposure No    Hobby Hazards No    Sleep Concern No    Stress Concern No    Weight Concern No    Special Diet No    Back Care No    Exercise No    Bike Helmet No    Seat Belt No    Self-Exams No   Social History Narrative    Not on file      Social Determinants of Health          Financial Resource Strain:     Difficulty of Paying Living Expenses: Not on file   Food Insecurity:     Worried About Running Out of Food in the Last Year: Not on file    Dennis of Food in the Last Year: Not on file   Transportation Needs:     Lack of Transportation (Medical): Not on file    Lack of Transportation (Non-Medical):  Not on file   Physical Activity:     Days of Exercise per Week: Not on file    Minutes of Exercise per Session: Not on file   Stress:     Feeling of Stress : Not on file   Social Connections:     Frequency of Communication with Friends and Family: Not on file    Frequency of Social Gatherings with Friends and Family: Not on file    Attends Voodoo Services: Not on file    Active Member of [address] [place] [address] or Organizations: Not on file    Attends Club or Organization Meetings: Not on file    Marital Status: Not on file   Intimate Partner Violence:     Fear of Current or Ex-Partner: Not on file    Emotionally Abused: Not on file    Physically Abused: Not on file    Sexually Abused: Not on file   Housing Stability:     Unable to Pay for Housing in the Last Year: Not on file    Number of [place] in the Last Year: Not on file    Unstable Housing in the Last Year: Not on file         OBJECTIVE:  Gravid , female NAD  Temp (24hrs), Av.8 °F (37.1 °C), Min:98.8 °F (37.1 °C), Max:98.8 °F (37.1 °C)     Visit Vitals  /60   Pulse 77   Temp 98.8 °F (37.1 °C)   Resp 16   Ht 5' 4\" (1.626 m)   Wt 112 kg (247 lb)   LMP  (LMP Unknown)   SpO2 99%   Breastfeeding No   BMI 42.40 kg/m²         Labs:  No results found for: WBC     Exam:  HEENT:  normal   Lungs:  clear  Cor:  RRR  Abdomen:  Fundal height 40 cm                    Soft between UC                    Clinical EFW 7 and 1/2 lbs  Fetal heart rate tracin, +accels, moderate variability,  No decels  Contraction pattern: every 10-12 mins  Cervix: 2/80/-2: Nitrazine positive  Fluid: clear        Impression:  IUP at 39 weeks 1 day with SROM in early labor  GBS negative  Discussed Pitocin augmentation after a reasonable 6-8 hrs of expectant management  Pt agreeable with plan of care  Simon Booker MD

## 2022-06-03 PROCEDURE — 75410000000 HC DELIVERY VAGINAL/SINGLE

## 2022-06-03 PROCEDURE — 65410000002 HC RM PRIVATE OB

## 2022-06-03 PROCEDURE — 74011250637 HC RX REV CODE- 250/637: Performed by: OBSTETRICS & GYNECOLOGY

## 2022-06-03 PROCEDURE — 75410000003 HC RECOV DEL/VAG/CSECN EA 0.5 HR

## 2022-06-03 PROCEDURE — 0KQM0ZZ REPAIR PERINEUM MUSCLE, OPEN APPROACH: ICD-10-PCS | Performed by: OBSTETRICS & GYNECOLOGY

## 2022-06-03 RX ORDER — ONDANSETRON 4 MG/1
4 TABLET, ORALLY DISINTEGRATING ORAL
Status: DISCONTINUED | OUTPATIENT
Start: 2022-06-03 | End: 2022-06-05 | Stop reason: HOSPADM

## 2022-06-03 RX ORDER — OXYTOCIN/RINGER'S LACTATE 30/500 ML
10 PLASTIC BAG, INJECTION (ML) INTRAVENOUS AS NEEDED
Status: COMPLETED | OUTPATIENT
Start: 2022-06-03 | End: 2022-06-03

## 2022-06-03 RX ORDER — HYDROCORTISONE 1 %
CREAM (GRAM) TOPICAL AS NEEDED
Status: DISCONTINUED | OUTPATIENT
Start: 2022-06-03 | End: 2022-06-05 | Stop reason: HOSPADM

## 2022-06-03 RX ORDER — OXYCODONE AND ACETAMINOPHEN 5; 325 MG/1; MG/1
1 TABLET ORAL
Status: DISCONTINUED | OUTPATIENT
Start: 2022-06-03 | End: 2022-06-05 | Stop reason: HOSPADM

## 2022-06-03 RX ORDER — IBUPROFEN 400 MG/1
800 TABLET ORAL EVERY 8 HOURS
Status: DISCONTINUED | OUTPATIENT
Start: 2022-06-03 | End: 2022-06-05 | Stop reason: HOSPADM

## 2022-06-03 RX ORDER — OXYTOCIN/RINGER'S LACTATE 30/500 ML
87.3 PLASTIC BAG, INJECTION (ML) INTRAVENOUS AS NEEDED
Status: DISCONTINUED | OUTPATIENT
Start: 2022-06-03 | End: 2022-06-05 | Stop reason: HOSPADM

## 2022-06-03 RX ORDER — SODIUM CHLORIDE 0.9 % (FLUSH) 0.9 %
5-40 SYRINGE (ML) INJECTION AS NEEDED
Status: DISCONTINUED | OUTPATIENT
Start: 2022-06-03 | End: 2022-06-05 | Stop reason: HOSPADM

## 2022-06-03 RX ORDER — AMMONIA 15 % (W/V)
1 AMPUL (EA) INHALATION AS NEEDED
Status: DISCONTINUED | OUTPATIENT
Start: 2022-06-03 | End: 2022-06-05 | Stop reason: HOSPADM

## 2022-06-03 RX ORDER — SIMETHICONE 80 MG
80 TABLET,CHEWABLE ORAL
Status: DISCONTINUED | OUTPATIENT
Start: 2022-06-03 | End: 2022-06-05 | Stop reason: HOSPADM

## 2022-06-03 RX ORDER — SODIUM CHLORIDE 0.9 % (FLUSH) 0.9 %
5-40 SYRINGE (ML) INJECTION EVERY 8 HOURS
Status: DISCONTINUED | OUTPATIENT
Start: 2022-06-03 | End: 2022-06-05 | Stop reason: HOSPADM

## 2022-06-03 RX ORDER — ACETAMINOPHEN 325 MG/1
650 TABLET ORAL
Status: DISCONTINUED | OUTPATIENT
Start: 2022-06-03 | End: 2022-06-05 | Stop reason: HOSPADM

## 2022-06-03 RX ORDER — DIPHENHYDRAMINE HCL 25 MG
25 CAPSULE ORAL
Status: DISCONTINUED | OUTPATIENT
Start: 2022-06-03 | End: 2022-06-05 | Stop reason: HOSPADM

## 2022-06-03 RX ORDER — OXYCODONE AND ACETAMINOPHEN 5; 325 MG/1; MG/1
2 TABLET ORAL
Status: DISCONTINUED | OUTPATIENT
Start: 2022-06-03 | End: 2022-06-05 | Stop reason: HOSPADM

## 2022-06-03 RX ORDER — DOCUSATE SODIUM 100 MG/1
100 CAPSULE, LIQUID FILLED ORAL
Status: DISCONTINUED | OUTPATIENT
Start: 2022-06-03 | End: 2022-06-05 | Stop reason: HOSPADM

## 2022-06-03 RX ORDER — LANOLIN ALCOHOL/MO/W.PET/CERES
3 CREAM (GRAM) TOPICAL
Status: DISCONTINUED | OUTPATIENT
Start: 2022-06-03 | End: 2022-06-05 | Stop reason: HOSPADM

## 2022-06-03 RX ADMIN — Medication 10000 MILLI-UNITS: at 04:31

## 2022-06-03 RX ADMIN — IBUPROFEN 800 MG: 400 TABLET, FILM COATED ORAL at 18:15

## 2022-06-03 RX ADMIN — IBUPROFEN 800 MG: 400 TABLET, FILM COATED ORAL at 08:45

## 2022-06-03 NOTE — ROUTINE PROCESS
1550- Bedside shift change report given to REVA Alexandra (oncoming nurse) by Fátima Snyder. Ernie Sharpe RN (offgoing nurse). Report included the following information SBAR.

## 2022-06-03 NOTE — PROGRESS NOTES
2330 - Bedside and Verbal shift change report given to REVA Miranda RN (oncoming nurse) by DECLAN Arevalo RN (offgoing nurse). Report included the following information SBAR, MAR and Recent Results. 2353 - pitocin started   0135 - straight cath, 300 out. 0145 - patient turned on left side, trendelenberg, peanut ball. 0220 - out of trendelenberg  0230 - fluid bolus started  0235 - This RN at bedside to assess, SVE 10/100/0, back to left side with peanut ball between ankles and knees closed. Patient not feeling pressure yet. Will call when she does or reevaluate in 30 minutes. 1565 - begin pushing   0414 - Dr. Colin Leon notified to come for delivery  9569 - Dr. Colin Leon at bedside for delivery. 56 -  living male infant  12 - st cath 400  0600 - st cath 1100  0815 - TRANSFER - OUT REPORT:    Verbal report given to Elisha Davis RN(name) on  [facility]  being transferred to MIU (unit) for routine progression of care       Report consisted of patients Situation, Background, Assessment and   Recommendations(SBAR). Information from the following report(s) SBAR, MAR and Recent Results was reviewed with the receiving nurse. Lines:   Peripheral IV 22 Left;Posterior Forearm (Active)   Site Assessment Clean, dry, & intact 22 0834   Phlebitis Assessment 0 22 0834   Infiltration Assessment 0 22 0834   Dressing Status Clean, dry, & intact 22 0834   Dressing Type Tape;Transparent 22 0834   Hub Color/Line Status Pink;Capped 22 1630        Opportunity for questions and clarification was provided.       Patient transported with:   Registered Nurse

## 2022-06-03 NOTE — ANESTHESIA PROCEDURE NOTES
Epidural Block    Patient location during procedure: OB  Start time: 6/2/2022 11:25 PM  End time: 6/2/2022 11:38 PM  Reason for block: labor epidural  Staffing  Performed: attending   Anesthesiologist: Romain Wilburn MD  Preanesthetic Checklist  Completed: patient identified, IV checked, site marked, risks and benefits discussed, surgical consent, monitors and equipment checked, pre-op evaluation and timeout performed  Block Placement  Patient position: left lateral decubitus  Prep: ChloraPrep  Sterility prep: cap, drape, gloves and mask  Sedation level: no sedation  Patient monitoring: continuous pulse oximetry and heart rate  Approach: midline  Location: lumbar  Lumbar location: L4-L5  Epidural  Loss of resistance technique: saline  Guidance: landmark technique  Needle  Needle type: Tuohy   Needle gauge: 17 G  Needle length: 9 cm  Needle insertion depth: 6 cm  Catheter type: end hole  Catheter size: 19 G  Catheter at skin depth: 12 cm  Catheter securement method: clear occlusive dressing, liquid medical adhesive and surgical tape  Test dose: negative  Assessment  Sensory level: T10  Block outcome: pain improved  Number of attempts: 1  Procedure assessment: patient tolerated procedure well with no immediate complications

## 2022-06-03 NOTE — L&D DELIVERY NOTE
Delivery Summary  Patient: Keily Rucker             Circumcision:   Declines (Elder)  Additional Delivery Comments - Pt progressed to full dilation and pushed just over an hour to deliver a VMI (Elder) in SOBEIDA position. There was a compound presentation of the left posterior arm. Nuchal cord x 2 easily reduced. No difficulty with delivery of shoulders/body. Baby was placed directly on mom's abdomen. After a 2 min delay, cord was doubly clamped and then cut by MGM. There was a 3VC and intact placenta delivered without difficulty. 2nd degree lac repaired with 2.0 monocryl. Mom and baby left in room doing skin to skin with MGM at her bedside.  mL  Weight 9#1oz (4120g)  Apgars 9 & 9      Information for the patient's :  Samia Dominguez [[id number]]       Labor Events:    Labor: No    Steroids: None   Cervical Ripening Date/Time:       Cervical Ripening Type: None   Antibiotics During Labor: No   Rupture Identifier: Sac 1    Rupture Date/Time: 2022 3:30 AM   Rupture Type: SROM   Amniotic Fluid Volume:  Moderate    Amniotic Fluid Description: Clear    Amniotic Fluid Odor: None    Induction: None       Induction Date/Time:        Indications for Induction:      Augmentation: Oxytocin   Augmentation Date/Time:      Indications for Augmentation: Prolonged ROM   Labor complications: None       Additional complications:        Delivery Events:  Indications For Episiotomy:     Episiotomy: None   Perineal Laceration(s): 2nd   Repaired: Yes   Periurethral Laceration Location:      Repaired:     Labial Laceration Location:     Repaired:     Sulcal Laceration Location:     Repaired:     Vaginal Laceration Location:     Repaired:     Cervical Laceration Location:     Repaired:     Repair Suture: Monocryl 2-0   Number of Repair Packets: 1   Estimated Blood Loss (ml):  ml   Quantitative Blood Loss (ml)                Delivery Date: 6/3/2022    Delivery Time: 4:23 AM  Delivery Type: Vaginal, Spontaneous  Sex:  Male    Gestational Age: 44w2d   Delivery Clinician:  Marilynn Simmons  Living Status: Living   Delivery Location: L&D L&D 8          APGARS  One minute Five minutes Ten minutes   Skin color: 1   1        Heart rate: 2   2        Grimace: 2   2        Muscle tone: 2   2        Breathin   2        Totals: 9   9            Presentation: Compound    Position: Right Occiput Anterior  Resuscitation Method:  Tactile Stimulation;Suctioning-bulb     Meconium Stained: None      Cord Information: 3 Vessels  Complications: Nuchal Cord Without Compressions  Cord around: head  Delayed cord clamping? Yes  Cord clamped date/time:6/3/2022  4:25 AM  Disposition of Cord Blood: Discard    Blood Gases Sent?: No    Placenta:  Date/Time: 6/3/2022  4:32 AM  Removal: Spontaneous      Appearance: Intact; Normal     Immaculata Measurements:  Birth Weight:        Birth Length:        Head Circumference:        Chest Circumference:       Abdominal Girth:       Other Providers:   Nu CERON;DARWIN GARRIDO;Cristy KUNZ, Obstetrician;Primary Nurse;Primary Immaculata Nurse;Scrub Tech           Cord pH:  none    Episiotomy: None   Laceration(s): 2nd     Estimated Blood Loss (ml): No data found    Labor Events  Method: None      Augmentation: Oxytocin   Cervical Ripening:     None        Hospital Problems  Date Reviewed: 2022          Codes Class Noted POA    PROM (premature rupture of membranes) ICD-10-CM: O42.90  ICD-9-CM: 658.10  2022 Unknown              Operative Vaginal Delivery - none    Group B Strep:   Lab Results   Component Value Date/Time    GrBStrep, External negative 2022 12:00 AM     Information for the patient's :  Ileana Worthington [[id number]]   No results found for: ABORH, PCTABR, PCTDIG, BILI, ABORHEXT, ABORH     No results found for: APH, APCO2, APO2, AHCO3, ABEC, ABDC, O2ST, EPHV, PCO2V, PO2V, HCO3V, EBEV, EBDV, SITE, RSCOM

## 2022-06-03 NOTE — ROUTINE PROCESS
TRANSFER - IN REPORT:    Verbal report received from [facility] RN(name) on [address]  being received from L&D(unit) for routine progression of care      Report consisted of patients Situation, Background, Assessment and   Recommendations(SBAR). Information from the following report(s) SBAR was reviewed with the receiving nurse. Opportunity for questions and clarification was provided. Assessment completed upon patients arrival to unit and care assumed.

## 2022-06-03 NOTE — PROGRESS NOTES
Late entry    In to eval pt and discuss POC. Pt tearful in the room and asking what can make the baby come faster. She was also having pain. We discussed pain management options as well as pitocin for augmentation. After discussion, pt would like to get an epidural and then start pitocin. Previously she'd wanted to try IV pain medicine.     Afebrile  Baseline fetal heart rate increased, mod variability, accels present, decels absent    Epidural per anesthesia  Pitocin ordered

## 2022-06-03 NOTE — ANESTHESIA PREPROCEDURE EVALUATION
Relevant Problems   No relevant active problems       Anesthetic History   No history of anesthetic complications            Review of Systems / Medical History  Patient summary reviewed, nursing notes reviewed and pertinent labs reviewed    Pulmonary  Within defined limits                 Neuro/Psych   Within defined limits           Cardiovascular  Within defined limits                Exercise tolerance: >4 METS     GI/Hepatic/Renal  Within defined limits              Endo/Other        Obesity     Other Findings   Comments: TIUP         Physical Exam    Airway  Mallampati: II  TM Distance: > 6 cm  Neck ROM: normal range of motion   Mouth opening: Normal     Cardiovascular  Regular rate and rhythm,  S1 and S2 normal,  no murmur, click, rub, or gallop             Dental  No notable dental hx       Pulmonary  Breath sounds clear to auscultation               Abdominal  GI exam deferred       Other Findings            Anesthetic Plan    ASA: 2  Anesthesia type: epidural      Post-op pain plan if not by surgeon: indwelling epidural catheter    Induction: Intravenous  Anesthetic plan and risks discussed with: Patient and Family

## 2022-06-04 PROCEDURE — 74011250637 HC RX REV CODE- 250/637: Performed by: OBSTETRICS & GYNECOLOGY

## 2022-06-04 PROCEDURE — 65410000002 HC RM PRIVATE OB

## 2022-06-04 RX ORDER — IBUPROFEN 800 MG/1
800 TABLET ORAL EVERY 8 HOURS
Qty: 30 TABLET | Refills: 1 | Status: SHIPPED | OUTPATIENT
Start: 2022-06-04

## 2022-06-04 RX ADMIN — IBUPROFEN 800 MG: 400 TABLET, FILM COATED ORAL at 20:06

## 2022-06-04 NOTE — LACTATION NOTE
This note was copied from a baby's chart. Not seen at breast, mother declines [facility] consult, expresses confidence in ability to breastfeed independently. Mother states Baby nursing well today,   mother is comfortable, baby feeding vigorously with rhythmic suck, swallow, breathe pattern, audible swallowing, and evident milk transfer, both breasts offered, baby is asleep following feeding.

## 2022-06-04 NOTE — DISCHARGE SUMMARY
Obstetrical Discharge Summary     Name: Rhoda Weir MRN: [id number]  SSN: [id number]    YOB: 2000  Age: 25 y.o. Sex: female      Admit Date: 2022    Discharge Date: 2022     Admitting Physician: Katlyn Sharif MD     Attending Physician:  Loraine Weathers MD     Admission Diagnoses: PROM (premature rupture of membranes) [O42.90]    Procedure Performed:  * No surgery found *  Surgical     Used for misc procedures    Discharge Diagnoses:   Information for the patient's :  Malou Mendez [[id number]]   Delivery of a 9 lb 1.3 oz (4.12 kg) male infant via Vaginal, Spontaneous on 6/3/2022 at 4:23 AM  by Mariann Blade. Apgars were 9  and 9 . Additional Diagnoses:   Hospital Problems  Date Reviewed: 2022          Codes Class Noted POA    PROM (premature rupture of membranes) ICD-10-CM: O42.90  ICD-9-CM: 658.10  2022 Unknown             Lab Results   Component Value Date/Time    Rubella, External immune 10/23/2021 12:00 AM    GrBStrep, External negative 2022 12:00 AM       Hospital Course: Normal hospital course following the delivery. Patient Disposition: Home      Followup Care:  Discharge Condition: stable  Activity as tolerated and No sex for 6 weeks  Regular Diet      Patient Instructions:   Current Discharge Medication List      START taking these medications    Details   ibuprofen (MOTRIN) 800 mg tablet Take 1 Tablet by mouth every eight (8) hours. Indications: pain  Qty: 30 Tablet, Refills: 1    Associated Diagnoses:  (spontaneous vaginal delivery)         CONTINUE these medications which have NOT CHANGED    Details   PNV [id number].74/[id number] fum/folic ac (PRENATAL PO) Take  by mouth. Reference my discharge instructions.     Follow-up Appointments   Procedures    FOLLOW UP VISIT Appointment in: 6 Weeks     Standing Status:   Standing     Number of Occurrences:   1     Order Specific Question:   Appointment in     Answer:   6 Weeks        Signed By:  Pennie Mauricio CNM     June 4, 2022

## 2022-06-04 NOTE — DISCHARGE INSTRUCTIONS
Patient Education        Después del parto (período de posparto): Instrucciones de cuidado  After Your Delivery (the Postpartum Period): Care Instructions  Instrucciones de cuidado     Felicidades por el nacimiento de barber bebé. Al igual que el Marguerite, el tiempo con el recién nacido puede ser un momento de [place], Isabel Buoy y agotamiento. Es posible que se sienta johns al mirar la nuno de barber pequeño bebé. También podría sentirse abrumada por barber nuevo ritmo de sueño y las nuevas responsabilidades. Al principio, los bebés suelen dormir rei el día y permanecen despiertos rei la noche. No tienen ningún patrón ni rutina. Podrían joe gritos ahogados, sacudirse y despertarse, o parecer sergo si tuvieran los ojos cruzados (bizcos). Todo esto es normal, e incluso la pueden hacer sonreír. Rei las primeras semanas siguientes al parto, trate de cuidarse daphne. Podría tardar de 4 a 6 semanas en volver a sentirse usted misma, y posiblemente más tiempo si le turner hecho saul cesárea. Es probable que se sienta muy fatigada rei varias semanas. Guy días estarán llenos de Arabella, holger también de mucha alegría. La atención de seguimiento es saul parte clave de barber tratamiento y seguridad. Asegúrese de hacer y acudir a todas las citas, y llame a barber médico si está teniendo problemas. También es saul buena idea saber los resultados de guy exámenes y mantener saul lista de los medicamentos que dulce. ¿Cómo puede cuidarse en el hogar? Cuide barber cuerpo después del parto  · Utilice toallas sanitarias en vez de tampones para las pérdidas de samreen que podrían durar hasta 2 semanas. · Alivie los cólicos con ibuprofeno (Advil, Motrin). · Alivie el dolor de las hemorroides y la maynor entre la vagina y el recto con compresas de hielo o de infusión de hamamelis Marlin Santiago (\"witch hazel\"). · Alivie el estreñimiento bebiendo mucho líquido y comiendo alimentos ricos en fibra.  Pregúntele a barber médico acerca de los ablandadores de heces de [employer]. · Límpiese con un chorrito suave de agua tibia de saul botella en vez de hacerlo con papel higiénico.  · Hamorton un baño de asiento en agua tibia varias veces al día. · Use un buen sostén de lactancia. Alivie el dolor y la hinchazón de los senos con toallitas de aseo húmedas tibias. · Si no está amamantando, utilice hielo en lugar de calor para aliviar el dolor de los senos. · Si está amamantando, barber período menstrual podría no comenzar hasta después de varios meses. Es posible que, al principio, samreen más y Kamuel[place] de lo que lo hacía antes del Annette Carlitos. · Espere hasta que haya sanado (de 4 a 6 semanas) antes de volver a tener relaciones sexuales. Barber médico le dirá cuándo puede tener relaciones sexuales. · Trate de no viajar con el bebé rei las primeras 5 o 6 semanas. Si hace un viaje akua en automóvil, fazal paradas frecuentes para caminar y estirarse. Evite el agotamiento  · Descanse todos los días. Trate de dormir la siesta cuando barber bebé también lo fazal. · Pídale a otro adulto que la acompañe por unos sheela después del [place]. · Planifique el cuidado de los niños si tiene otros hijos. · Sea flexible para que pueda comer a horas fuera de lo común y dormir cuando lo necesite. Tanto usted sergo barber bebé están creando horarios nuevos. · Planee pequeñas salidas para estar fuera de casa. El cambio podría hacer que se sienta menos fatigada. · Pida ayuda para cocinar y [address] hogar y las compras. Recuerde que barber principal tarea consiste en cuidar a barber bebé. Sepa qué ayuda puede recibir en justin de tener depresión posparto  · La depresión posparto es común rei las primeras 1 o 2 semanas siguientes al parto. Podría llorar o sentirse lizzette o irritable sin razón alguna. · Descanse cada vez que pueda hacerlo. Estar fatigada dificulta manejar las emociones. · Salga a caminar con barber bebé. · Hable con barber steven, guy amigos y guy familiares acerca de guy sentimientos.   · Si guy síntomas saldana más de unas pocas semanas, o si se siente muy deprimida, pídale ayuda a barber médico.  · La depresión posparto puede tratarse. Los grupos de apoyo y la asesoría psicológica pueden ser Lillian Tanzanian. A veces, los medicamentos también pueden ayudar. Manténgase saludable  · Coma alimentos saludables para tener más energía y adelgazar las libras adicionales que engordó con el bebé. · Si amamanta, evite las drogas. Si dejó de fumar rei el embarazo, trate de no volver a fumar. Si opta por mary saul bebida alcohólica de vez en cuando, solo tome saul bebida y limite la cantidad de ocasiones en que illy alcohol. Después de beber alcohol, espere al menos 2 horas antes de amamantar para reducir la cantidad de alcohol que el bebé pueda recibir a través de la [place]. · Comience a hacer ejercicios diarios después de 4 a 6 semanas, holger descanse cuando se sienta fatigada. · Aprenda ejercicios para tonificar el abdomen. Jono ejercicios de Kegel para recuperar la fuerza de los músculos pélvicos. Puede hacer los ejercicios de Kegel mientras está de pie o sentada. ? Apriete los mismos músculos que usted usaría para detener la [place]. El abdomen y los muslos no deben moverse. ? Manténgalos apretados rei 3 segundos y, luego, relájelos otros 3 segundos. ? Empiece con 3 segundos. Robin Mariela, añada 1 eklsie cada semana hasta que sea capaz de apretar rei 10 segundos. ? Repita el ejercicio entre 10 y 13 veces Ken Mast Jono omid o más sesiones cada día. · Busque saul clase para nuevas madres y recién nacidos que tenga un tiempo de ejercicio. · Si le turner hecho saul cesárea, dese un poco más de tiempo antes de hacer ejercicio, y tenga cuidado. ¿Cuándo debe pedir ayuda? Llame al 911  en cualquier momento que considere que necesita atención de [place].  Por ejemplo, llame si:    · Tiene pensamientos de lastimarse o de hacerle daño a barber bebé o a otra persona.     · Se desmayó (perdió el conocimiento).     · Tiene dolor en el pecho, le falta el aire o tose samreen.     · Tiene convulsiones. Llame a barber médico ahora mismo o busque atención médica de inmediato si:    · Tiene sangrado vaginal intenso. [place] significa que está expulsando coágulos sanguíneos y empapando saul toalla sanitaria cada hora por 2 horas o más.     · Está mareada o aturdida, o siente sergo que se puede desmayar.     · Tiene fiebre.     · Tiene dolor abdominal nuevo o más intenso.     · Tiene señales de un coágulo de samreen en la pierna (que se llama trombosis venosa profunda), sergo:  ? Dolor en la pantorrilla, el muslo, la marybeth o detrás de la rodilla. ? Enrojecimiento e hinchazón en la pierna o la marybeth.     · Tiene señales de preeclampsia, sergo:  ? Hinchazón repentina de la salas, las ben o los pies. ? Nuevos problemas de la vista (sergo oscurecimiento, yanelis borroso o yanelis puntos). ? Dolor de ruthy intenso. Preste especial atención a los cambios en barber janelle y asegúrese de comunicarse con barber médico si:    · Barber sangrado vaginal parece volverse más intenso.     · Tiene flujo vaginal nuevo o que empeora.     · Se siente lizzette, ansiosa o sin esperanzas rei más de unos pocos días.     · No mejora sergo se esperaba. ¿Dónde puede encontrar más información en inglés? Janett Arcelia a http://www.gray.com/  Marlys A907 en la búsqueda para aprender más acerca de \"Después del parto (período de posparto): Instrucciones de cuidado. \"  Revisado: 16 junio, 2021               Versión del contenido: 13.2  © 6925-2303 Healthwise, Incorporated. Las instrucciones de cuidado fueron adaptadas bajo licencia por [facility] [employer] (which disclaims liability or warranty for this information). Si usted tiene [place] afección médica o sobre estas instrucciones, siempre pregunte a barber profesional de janelle. [employer] niega toda garantía o responsabilidad por barber uso de esta información.

## 2022-06-04 NOTE — ROUTINE PROCESS
Verbal shift change report given to Todd Mendoza (oncoming nurse) by Vamshi Farnsworth (offgoing nurse). Report included the following information SBAR.

## 2022-06-04 NOTE — PROGRESS NOTES
Bedside and Verbal shift change report given to KATHY Collado RN (oncoming nurse) by Eliu Guerra RN (offgoing nurse). Report included the following information SBAR, Kardex, Procedure Summary, Intake/Output, MAR and Recent Results.

## 2022-06-04 NOTE — ROUTINE PROCESS
Bedside and Verbal shift change report given to [facility] (oncoming nurse) by Maggie Darnell (offgoing nurse). Report included the following information SBAR.

## 2022-06-04 NOTE — PROGRESS NOTES
Postpartum Note  S/P , PPday #1  Ambulating and voiding without diff  Corwin po and po meds well  Breastfeeding well established  Desires D/C this afternoon if baby can go    O: VSS, Afebrile       Patient Vitals for the past 24 hrs:   Temp Pulse Resp BP SpO2   22 0010 98.4 °F (36.9 °C) 73 16 107/60 98 %   22 2000 °F (36.8 °C) 78 16 105/73 98 %   22 1730 97.9 °F (36.6 °C) 96 16 111/65 100 %   22 1314 98.4 °F (36.9 °C) 76 18 100/62 98 %           Breasts soft, NT        FF @ U-1 ML, Lochia Small Rubra        Perineum Intact        Ext NT, No REP, Neg Jane's    A/P: Routine PP care          Maternal Education          Lactation consultation prn          Plan Discharge this pm if baby is able to go           TRACI Hsu/DOLLY

## 2022-06-05 VITALS
HEART RATE: 61 BPM | BODY MASS INDEX: 42.17 KG/M2 | OXYGEN SATURATION: 98 % | HEIGHT: 64 IN | TEMPERATURE: 97.9 F | DIASTOLIC BLOOD PRESSURE: 62 MMHG | SYSTOLIC BLOOD PRESSURE: 97 MMHG | RESPIRATION RATE: 14 BRPM | WEIGHT: 247 LBS

## 2022-06-05 NOTE — PROGRESS NOTES
Postpartum Note    S/P , PPD#2  Ambulating and Voiding without diff  Corwin po and po meds well  Breastfeeding and Bottle feeding well established  Desires discharge home    O:  A,A&O x 3        VSS, Afebrile       Patient Vitals for the past 24 hrs:   Temp Pulse Resp BP SpO2   22 0210 97.9 °F (36.6 °C) 61 14 97/62 98 %   22 1945 97.9 °F (36.6 °C) 68 18 109/68 97 %   22 1254 97.8 °F (36.6 °C) 74 14 128/88 98 %          Breasts soft, NT       Abd soft, NT/ND       FF@ U-1, ML       Scant Lochia Rubra       Perineum Intact       Ext without REP, Neg Jane's    A/P: Routine PP care          Discharge home in stable cond. RTO 6 wks for PP exam, sooner prn            Merlene TRACI Calderon/DOLLY

## 2022-06-05 NOTE — LACTATION NOTE
This note was copied from a baby's chart. Not seen, Mother is giving formula and offering breast.  She has declined offers of help. Mother has family that is helping her and advising her to give formula in addition to breastfeeding.

## 2022-06-05 NOTE — PROGRESS NOTES
1937: Bedside and Verbal shift change report given to ARSEN Negron, TIM (oncoming nurse) by Gena Osorio RN (offgoing nurse). Report given with SBAR, Kardex, Intake/Output and MAR.    0730: Bedside and Verbal shift change report given to KATHY Osorio RN  (oncoming nurse) by ARSEN Negron RN (offgoing nurse). Report given with SBAR, Kardex, Intake/Output and MAR.

## 2022-06-05 NOTE — PROGRESS NOTES
Bedside and Verbal shift change report given to KATHY Collado RN (oncoming nurse) by ARSEN Sánchez RN (offgoing nurse). Report included the following information SBAR, Kardex, Procedure Summary, Intake/Output, MAR and Recent Results.

## 2022-06-05 NOTE — ANESTHESIA POSTPROCEDURE EVALUATION
Post-Anesthesia Evaluation and Assessment    Patient: Yonathan Mi MRN: [id number]  SSN: [id number]    YOB: 2000  Age: 25 y.o. Sex: female      I have evaluated the patient and they are stable and ready for discharge from the PACU. Cardiovascular Function/Vital Signs  Visit Vitals  /68 (BP 1 Location: Right upper arm, BP Patient Position: At rest)   Pulse 68   Temp 36.6 °C (97.9 °F)   Resp 18   Ht 5' 4\" (1.626 m)   Wt 112 kg (247 lb)   SpO2 97%   Breastfeeding Unknown   BMI 42.40 kg/m²       Patient is status post * No anesthesia type entered * anesthesia for * No procedures listed *. Nausea/Vomiting: None    Postoperative hydration reviewed and adequate. Pain:  Pain Scale 1: Numeric (0 - 10) (06/04/22 1945)  Pain Intensity 1: 2 (06/04/22 1945)   Managed    Neurological Status:   Neuro (WDL): Within Defined Limits (06/03/22 0451)   At baseline    Mental Status, Level of Consciousness: Alert and  oriented to person, place, and time    Pulmonary Status:   O2 Device: None (Room air) (06/04/22 1945)   Adequate oxygenation and airway patent    Complications related to anesthesia: None    Post-anesthesia assessment completed. No concerns    Signed By: Raffi Sanders MD     June 4, 2022              * No procedures listed *.    epidural    <BSHSIANPOST>    INITIAL Post-op Vital signs: No vitals data found for the desired time range.